# Patient Record
Sex: FEMALE | Race: WHITE | Employment: FULL TIME | URBAN - METROPOLITAN AREA
[De-identification: names, ages, dates, MRNs, and addresses within clinical notes are randomized per-mention and may not be internally consistent; named-entity substitution may affect disease eponyms.]

---

## 2017-06-21 LAB — RUBELLA, EXTERNAL: NORMAL

## 2018-09-06 LAB
HBSAG, EXTERNAL: NEGATIVE
HIV, EXTERNAL: NON REACTIVE
TYPE, ABO & RH, EXTERNAL: NORMAL

## 2018-11-19 ENCOUNTER — HOSPITAL ENCOUNTER (EMERGENCY)
Age: 28
Discharge: HOME | End: 2018-11-19
Attending: EMERGENCY MEDICINE
Payer: COMMERCIAL

## 2018-11-19 VITALS
HEART RATE: 85 BPM | SYSTOLIC BLOOD PRESSURE: 113 MMHG | RESPIRATION RATE: 16 BRPM | BODY MASS INDEX: 31.12 KG/M2 | WEIGHT: 193.6 LBS | OXYGEN SATURATION: 97 % | DIASTOLIC BLOOD PRESSURE: 77 MMHG | TEMPERATURE: 98 F | HEIGHT: 66 IN

## 2018-11-19 DIAGNOSIS — Z3A.19 19 WEEKS GESTATION OF PREGNANCY: ICD-10-CM
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
## 2018-11-19 DIAGNOSIS — S20.212A CONTUSION OF LEFT CHEST WALL, INITIAL ENCOUNTER: ICD-10-CM

## 2018-11-19 PROCEDURE — 74011250637 HC RX REV CODE- 250/637: Performed by: PHYSICIAN ASSISTANT

## 2018-11-19 PROCEDURE — 99284 EMERGENCY DEPT VISIT MOD MDM: CPT

## 2018-11-19 RX ORDER — ACETAMINOPHEN 325 MG/1
975 TABLET ORAL
Status: COMPLETED | OUTPATIENT
Start: 2018-11-19 | End: 2018-11-19

## 2018-11-19 RX ADMIN — ACETAMINOPHEN 975 MG: 325 TABLET, FILM COATED ORAL at 19:04
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
## 2018-11-20 NOTE — ED NOTES
Discharge instructions reviewed by provider; pt verbalized understanding of discharge and medication use. Vitals updated and pt ambulated from department with steady gait. No apparent distress noted.

## 2019-01-23 LAB
ANTIBODY SCREEN, EXTERNAL: NEGATIVE
T. PALLIDUM, EXTERNAL: NEGATIVE

## 2019-03-18 LAB — GRBS, EXTERNAL: NEGATIVE

## 2019-04-21 ENCOUNTER — ANESTHESIA (OUTPATIENT)
Dept: LABOR AND DELIVERY | Age: 29
End: 2019-04-21
Payer: COMMERCIAL

## 2019-04-21 ENCOUNTER — HOSPITAL ENCOUNTER (INPATIENT)
Age: 29
LOS: 3 days | Discharge: HOME | End: 2019-04-24
Attending: OBSTETRICS & GYNECOLOGY | Admitting: OBSTETRICS & GYNECOLOGY
Payer: COMMERCIAL

## 2019-04-21 ENCOUNTER — ANESTHESIA EVENT (OUTPATIENT)
Dept: LABOR AND DELIVERY | Age: 29
End: 2019-04-21
Payer: COMMERCIAL

## 2019-04-21 DIAGNOSIS — Z34.90 PREGNANCY, UNSPECIFIED GESTATIONAL AGE: Primary | ICD-10-CM

## 2019-04-21 LAB
ERYTHROCYTE [DISTWIDTH] IN BLOOD BY AUTOMATED COUNT: 13.6 % (ref 11.5–14.5)
HCT VFR BLD AUTO: 42.5 % (ref 35–47)
HGB BLD-MCNC: 13.8 G/DL (ref 11.5–16)
MCH RBC QN AUTO: 29.4 PG (ref 26–34)
MCHC RBC AUTO-ENTMCNC: 32.5 G/DL (ref 30–36.5)
MCV RBC AUTO: 90.4 FL (ref 80–99)
NRBC # BLD: 0 K/UL (ref 0–0.01)
NRBC BLD-RTO: 0 PER 100 WBC
PLATELET # BLD AUTO: 229 K/UL (ref 150–400)
PMV BLD AUTO: 11.6 FL (ref 8.9–12.9)
RBC # BLD AUTO: 4.7 M/UL (ref 3.8–5.2)
WBC # BLD AUTO: 11 K/UL (ref 3.6–11)

## 2019-04-21 PROCEDURE — A4300 CATH IMPL VASC ACCESS PORTAL: HCPCS

## 2019-04-21 PROCEDURE — 74011000250 HC RX REV CODE- 250

## 2019-04-21 PROCEDURE — 36415 COLL VENOUS BLD VENIPUNCTURE: CPT

## 2019-04-21 PROCEDURE — 3E0R3BZ INTRODUCTION OF ANESTHETIC AGENT INTO SPINAL CANAL, PERCUTANEOUS APPROACH: ICD-10-PCS | Performed by: ANESTHESIOLOGY

## 2019-04-21 PROCEDURE — 85027 COMPLETE CBC AUTOMATED: CPT

## 2019-04-21 PROCEDURE — 74011250636 HC RX REV CODE- 250/636

## 2019-04-21 PROCEDURE — 65270000029 HC RM PRIVATE

## 2019-04-21 PROCEDURE — 94760 N-INVAS EAR/PLS OXIMETRY 1: CPT

## 2019-04-21 PROCEDURE — 74011250636 HC RX REV CODE- 250/636: Performed by: OBSTETRICS & GYNECOLOGY

## 2019-04-21 PROCEDURE — 77030011943

## 2019-04-21 RX ORDER — FENTANYL CITRATE 50 UG/ML
100 INJECTION, SOLUTION INTRAMUSCULAR; INTRAVENOUS ONCE
Status: ACTIVE | OUTPATIENT
Start: 2019-04-21 | End: 2019-04-22

## 2019-04-21 RX ORDER — NALBUPHINE HYDROCHLORIDE 10 MG/ML
10 INJECTION, SOLUTION INTRAMUSCULAR; INTRAVENOUS; SUBCUTANEOUS
Status: DISCONTINUED | OUTPATIENT
Start: 2019-04-21 | End: 2019-04-22 | Stop reason: HOSPADM

## 2019-04-21 RX ORDER — NALOXONE HYDROCHLORIDE 0.4 MG/ML
0.4 INJECTION, SOLUTION INTRAMUSCULAR; INTRAVENOUS; SUBCUTANEOUS AS NEEDED
Status: DISCONTINUED | OUTPATIENT
Start: 2019-04-21 | End: 2019-04-22 | Stop reason: HOSPADM

## 2019-04-21 RX ORDER — SODIUM CHLORIDE 0.9 % (FLUSH) 0.9 %
5-40 SYRINGE (ML) INJECTION AS NEEDED
Status: DISCONTINUED | OUTPATIENT
Start: 2019-04-21 | End: 2019-04-22

## 2019-04-21 RX ORDER — LIDOCAINE HYDROCHLORIDE AND EPINEPHRINE 15; 5 MG/ML; UG/ML
INJECTION, SOLUTION EPIDURAL
Status: COMPLETED | OUTPATIENT
Start: 2019-04-21 | End: 2019-04-21

## 2019-04-21 RX ORDER — FENTANYL/BUPIVACAINE/NS/PF 2-1250MCG
PREFILLED PUMP RESERVOIR EPIDURAL
Status: DISCONTINUED
Start: 2019-04-21 | End: 2019-04-21 | Stop reason: WASHOUT

## 2019-04-21 RX ORDER — FENTANYL CITRATE 50 UG/ML
INJECTION, SOLUTION INTRAMUSCULAR; INTRAVENOUS AS NEEDED
Status: DISCONTINUED | OUTPATIENT
Start: 2019-04-21 | End: 2019-04-22 | Stop reason: HOSPADM

## 2019-04-21 RX ORDER — FENTANYL CITRATE 50 UG/ML
INJECTION, SOLUTION INTRAMUSCULAR; INTRAVENOUS
Status: DISPENSED
Start: 2019-04-21 | End: 2019-04-22

## 2019-04-21 RX ORDER — ONDANSETRON 2 MG/ML
4 INJECTION INTRAMUSCULAR; INTRAVENOUS
Status: DISCONTINUED | OUTPATIENT
Start: 2019-04-21 | End: 2019-04-22

## 2019-04-21 RX ORDER — SODIUM CHLORIDE 0.9 % (FLUSH) 0.9 %
5-40 SYRINGE (ML) INJECTION EVERY 8 HOURS
Status: DISCONTINUED | OUTPATIENT
Start: 2019-04-21 | End: 2019-04-22

## 2019-04-21 RX ORDER — BUPIVACAINE HYDROCHLORIDE 2.5 MG/ML
INJECTION, SOLUTION EPIDURAL; INFILTRATION; INTRACAUDAL
Status: COMPLETED | OUTPATIENT
Start: 2019-04-21 | End: 2019-04-21

## 2019-04-21 RX ORDER — CETIRIZINE HCL 10 MG
10 TABLET ORAL DAILY
COMMUNITY

## 2019-04-21 RX ORDER — EPHEDRINE SULFATE/0.9% NACL/PF 50 MG/5 ML
SYRINGE (ML) INTRAVENOUS
Status: DISCONTINUED
Start: 2019-04-21 | End: 2019-04-22 | Stop reason: WASHOUT

## 2019-04-21 RX ORDER — BUPIVACAINE HYDROCHLORIDE 2.5 MG/ML
INJECTION, SOLUTION EPIDURAL; INFILTRATION; INTRACAUDAL
Status: COMPLETED
Start: 2019-04-21 | End: 2019-04-21

## 2019-04-21 RX ORDER — TERBUTALINE SULFATE 1 MG/ML
0.25 INJECTION SUBCUTANEOUS AS NEEDED
Status: DISCONTINUED | OUTPATIENT
Start: 2019-04-21 | End: 2019-04-22

## 2019-04-21 RX ORDER — SODIUM CHLORIDE, SODIUM LACTATE, POTASSIUM CHLORIDE, CALCIUM CHLORIDE 600; 310; 30; 20 MG/100ML; MG/100ML; MG/100ML; MG/100ML
125 INJECTION, SOLUTION INTRAVENOUS CONTINUOUS
Status: DISCONTINUED | OUTPATIENT
Start: 2019-04-21 | End: 2019-04-22

## 2019-04-21 RX ORDER — FENTANYL/BUPIVACAINE/NS/PF 2-1250MCG
1-16 PREFILLED PUMP RESERVOIR EPIDURAL CONTINUOUS
Status: DISCONTINUED | OUTPATIENT
Start: 2019-04-21 | End: 2019-04-22

## 2019-04-21 RX ORDER — FENTANYL CITRATE 50 UG/ML
100 INJECTION, SOLUTION INTRAMUSCULAR; INTRAVENOUS
Status: DISCONTINUED | OUTPATIENT
Start: 2019-04-21 | End: 2019-04-22 | Stop reason: HOSPADM

## 2019-04-21 RX ADMIN — LIDOCAINE HYDROCHLORIDE AND EPINEPHRINE 3 ML: 15; 5 INJECTION, SOLUTION EPIDURAL at 21:45

## 2019-04-21 RX ADMIN — Medication 10 ML/HR: at 21:56

## 2019-04-21 RX ADMIN — SODIUM CHLORIDE, SODIUM LACTATE, POTASSIUM CHLORIDE, AND CALCIUM CHLORIDE 125 ML/HR: 600; 310; 30; 20 INJECTION, SOLUTION INTRAVENOUS at 22:04

## 2019-04-21 RX ADMIN — SODIUM CHLORIDE, SODIUM LACTATE, POTASSIUM CHLORIDE, AND CALCIUM CHLORIDE 1000 ML: 600; 310; 30; 20 INJECTION, SOLUTION INTRAVENOUS at 20:57

## 2019-04-21 RX ADMIN — FENTANYL CITRATE 100 MCG: 50 INJECTION, SOLUTION INTRAMUSCULAR; INTRAVENOUS at 21:47

## 2019-04-21 RX ADMIN — BUPIVACAINE HYDROCHLORIDE 5 ML: 2.5 INJECTION, SOLUTION EPIDURAL; INFILTRATION; INTRACAUDAL at 21:45

## 2019-04-22 PROCEDURE — 74011250636 HC RX REV CODE- 250/636: Performed by: OBSTETRICS & GYNECOLOGY

## 2019-04-22 PROCEDURE — 0KQM0ZZ REPAIR PERINEUM MUSCLE, OPEN APPROACH: ICD-10-PCS | Performed by: OBSTETRICS & GYNECOLOGY

## 2019-04-22 PROCEDURE — 77030005537 HC CATH URETH BARD -A

## 2019-04-22 PROCEDURE — 65410000002 HC RM PRIVATE OB

## 2019-04-22 PROCEDURE — 74011000250 HC RX REV CODE- 250: Performed by: ANESTHESIOLOGY

## 2019-04-22 PROCEDURE — 75410000000 HC DELIVERY VAGINAL/SINGLE

## 2019-04-22 PROCEDURE — 77030018846 HC SOL IRR STRL H20 ICUM -A

## 2019-04-22 PROCEDURE — 76060000078 HC EPIDURAL ANESTHESIA

## 2019-04-22 PROCEDURE — 75410000002 HC LABOR FEE PER 1 HR

## 2019-04-22 PROCEDURE — 74011250636 HC RX REV CODE- 250/636

## 2019-04-22 PROCEDURE — 77030011943

## 2019-04-22 PROCEDURE — 74011250637 HC RX REV CODE- 250/637: Performed by: OBSTETRICS & GYNECOLOGY

## 2019-04-22 PROCEDURE — 75410000003 HC RECOV DEL/VAG/CSECN EA 0.5 HR

## 2019-04-22 PROCEDURE — 99284 EMERGENCY DEPT VISIT MOD MDM: CPT

## 2019-04-22 RX ORDER — CHLOROPROCAINE HYDROCHLORIDE 30 MG/ML
INJECTION, SOLUTION EPIDURAL; INFILTRATION; INTRACAUDAL; PERINEURAL
Status: DISCONTINUED
Start: 2019-04-22 | End: 2019-04-22

## 2019-04-22 RX ORDER — OXYTOCIN/RINGER'S LACTATE 20/1000 ML
999 PLASTIC BAG, INJECTION (ML) INTRAVENOUS AS NEEDED
Status: DISCONTINUED | OUTPATIENT
Start: 2019-04-22 | End: 2019-04-24 | Stop reason: HOSPADM

## 2019-04-22 RX ORDER — DOCUSATE SODIUM 100 MG/1
100 CAPSULE, LIQUID FILLED ORAL
Status: DISCONTINUED | OUTPATIENT
Start: 2019-04-22 | End: 2019-04-24 | Stop reason: HOSPADM

## 2019-04-22 RX ORDER — OXYTOCIN/0.9 % SODIUM CHLORIDE 30/500 ML
0-25 PLASTIC BAG, INJECTION (ML) INTRAVENOUS
Status: DISCONTINUED | OUTPATIENT
Start: 2019-04-22 | End: 2019-04-24 | Stop reason: HOSPADM

## 2019-04-22 RX ORDER — HYDROCORTISONE ACETATE PRAMOXINE HCL 2.5; 1 G/100G; G/100G
CREAM TOPICAL AS NEEDED
Status: DISCONTINUED | OUTPATIENT
Start: 2019-04-22 | End: 2019-04-24 | Stop reason: HOSPADM

## 2019-04-22 RX ORDER — OXYCODONE AND ACETAMINOPHEN 5; 325 MG/1; MG/1
2 TABLET ORAL
Status: DISCONTINUED | OUTPATIENT
Start: 2019-04-22 | End: 2019-04-24 | Stop reason: HOSPADM

## 2019-04-22 RX ORDER — AMMONIA 15 % (W/V)
1 AMPUL (EA) INHALATION AS NEEDED
Status: DISCONTINUED | OUTPATIENT
Start: 2019-04-22 | End: 2019-04-24 | Stop reason: HOSPADM

## 2019-04-22 RX ORDER — SODIUM CHLORIDE 0.9 % (FLUSH) 0.9 %
5-40 SYRINGE (ML) INJECTION EVERY 8 HOURS
Status: DISCONTINUED | OUTPATIENT
Start: 2019-04-22 | End: 2019-04-24 | Stop reason: HOSPADM

## 2019-04-22 RX ORDER — HYDROCORTISONE 1 %
CREAM (GRAM) TOPICAL AS NEEDED
Status: DISCONTINUED | OUTPATIENT
Start: 2019-04-22 | End: 2019-04-24 | Stop reason: HOSPADM

## 2019-04-22 RX ORDER — OXYTOCIN/0.9 % SODIUM CHLORIDE 30/500 ML
PLASTIC BAG, INJECTION (ML) INTRAVENOUS
Status: COMPLETED
Start: 2019-04-22 | End: 2019-04-22

## 2019-04-22 RX ORDER — LIDOCAINE HYDROCHLORIDE 10 MG/ML
INJECTION INFILTRATION; PERINEURAL
Status: DISCONTINUED
Start: 2019-04-22 | End: 2019-04-22

## 2019-04-22 RX ORDER — IBUPROFEN 400 MG/1
800 TABLET ORAL EVERY 8 HOURS
Status: DISCONTINUED | OUTPATIENT
Start: 2019-04-22 | End: 2019-04-24 | Stop reason: HOSPADM

## 2019-04-22 RX ORDER — FENTANYL CITRATE 50 UG/ML
100 INJECTION, SOLUTION INTRAMUSCULAR; INTRAVENOUS ONCE
Status: COMPLETED | OUTPATIENT
Start: 2019-04-22 | End: 2019-04-22

## 2019-04-22 RX ORDER — SODIUM CHLORIDE 0.9 % (FLUSH) 0.9 %
5-40 SYRINGE (ML) INJECTION AS NEEDED
Status: DISCONTINUED | OUTPATIENT
Start: 2019-04-22 | End: 2019-04-24 | Stop reason: HOSPADM

## 2019-04-22 RX ORDER — ACETAMINOPHEN 325 MG/1
650 TABLET ORAL
Status: DISCONTINUED | OUTPATIENT
Start: 2019-04-22 | End: 2019-04-24 | Stop reason: HOSPADM

## 2019-04-22 RX ORDER — OXYTOCIN/RINGER'S LACTATE 20/1000 ML
125 PLASTIC BAG, INJECTION (ML) INTRAVENOUS AS NEEDED
Status: DISCONTINUED | OUTPATIENT
Start: 2019-04-22 | End: 2019-04-24 | Stop reason: HOSPADM

## 2019-04-22 RX ORDER — OXYCODONE AND ACETAMINOPHEN 5; 325 MG/1; MG/1
1 TABLET ORAL
Status: DISCONTINUED | OUTPATIENT
Start: 2019-04-22 | End: 2019-04-24 | Stop reason: HOSPADM

## 2019-04-22 RX ORDER — ONDANSETRON 4 MG/1
4 TABLET, ORALLY DISINTEGRATING ORAL
Status: DISCONTINUED | OUTPATIENT
Start: 2019-04-22 | End: 2019-04-24 | Stop reason: HOSPADM

## 2019-04-22 RX ORDER — DIPHENHYDRAMINE HCL 25 MG
25 CAPSULE ORAL
Status: DISCONTINUED | OUTPATIENT
Start: 2019-04-22 | End: 2019-04-24 | Stop reason: HOSPADM

## 2019-04-22 RX ORDER — SIMETHICONE 80 MG
80 TABLET,CHEWABLE ORAL
Status: DISCONTINUED | OUTPATIENT
Start: 2019-04-22 | End: 2019-04-24 | Stop reason: HOSPADM

## 2019-04-22 RX ADMIN — IBUPROFEN 800 MG: 400 TABLET ORAL at 20:13

## 2019-04-22 RX ADMIN — OXYTOCIN-SODIUM CHLORIDE 0.9% IV SOLN 30 UNIT/500ML 2 MILLI-UNITS/MIN: 30-0.9/5 SOLUTION at 07:01

## 2019-04-22 RX ADMIN — DOCUSATE SODIUM 100 MG: 100 CAPSULE, LIQUID FILLED ORAL at 20:13

## 2019-04-22 RX ADMIN — ACETAMINOPHEN 650 MG: 325 TABLET ORAL at 22:18

## 2019-04-22 RX ADMIN — SODIUM CHLORIDE, SODIUM LACTATE, POTASSIUM CHLORIDE, AND CALCIUM CHLORIDE 125 ML/HR: 600; 310; 30; 20 INJECTION, SOLUTION INTRAVENOUS at 06:05

## 2019-04-22 RX ADMIN — ACETAMINOPHEN 650 MG: 325 TABLET ORAL at 18:19

## 2019-04-22 RX ADMIN — FENTANYL CITRATE 100 MCG: 50 INJECTION, SOLUTION INTRAMUSCULAR; INTRAVENOUS at 10:31

## 2019-04-22 RX ADMIN — Medication 10 ML/HR: at 04:13

## 2019-04-22 RX ADMIN — IBUPROFEN 800 MG: 400 TABLET ORAL at 12:38

## 2019-04-22 NOTE — PROGRESS NOTES
1145: Bedside and Verbal shift change report given to SHI Fraser (oncoming nurse) by BOOGIE Pandey (offgoing nurse). Report included the following information SBAR, Procedure Summary, Intake/Output, MAR and Recent Results.

## 2019-04-22 NOTE — PROGRESS NOTES
4/21/2019  8:04 PM Pt arrived to L&D room 4 with c/o UCs since about 1300. States that they are now q 3-5 mins. Denies LOF. Reports pink, mucousy discharge. 2043 Spoke to Dr. Eunice Putnam on phone. Updated him on pt's arrival, c/o, SVE, FHR tracing, UC pattern, GBS negative, and that pt plans for an epidural but does not want one at this time. Inpatient orders received. 2100 Dr. Eunice Putnam at bedside, assessing pt. Pt ready for epidural at this time. 4/22/19@ 4366 Dr. Eunice Putnam at bedside. Aware that we have been pushing for approx. 2 hours after about an hour of laboring down and that pt pushing effectively at times, but at other times not sure where/how to push. Assessed strip and progress of pushing. Orders received for Pitocin and to continue pushing.

## 2019-04-22 NOTE — ANESTHESIA POSTPROCEDURE EVALUATION
Post-Anesthesia Evaluation and Assessment Patient: Haley Solorio MRN: [id number]  SSN: [id number] YOB: 1990  Age: 29 y.o. Sex: female I have evaluated the patient and they are stable and ready for discharge from the PACU. Cardiovascular Function/Vital Signs Visit Vitals /83 (BP 1 Location: Left arm, BP Patient Position: At rest) Pulse 96 Temp 37 °C (98.6 °F) Resp 16 Ht 5' 6\" (1.676 m) Wt 94.8 kg (209 lb) SpO2 96% Breastfeeding? Unknown BMI 33.73 kg/m² Patient is status post * No anesthesia type entered * anesthesia for * No procedures listed *. Nausea/Vomiting: None Postoperative hydration reviewed and adequate. Pain: 
Pain Scale 1: Numeric (0 - 10) (04/22/19 1408) Pain Intensity 1: 4 (04/22/19 1408) Managed Neurological Status: At baseline Mental Status, Level of Consciousness: Alert and  oriented to person, place, and time Pulmonary Status:  
O2 Device: Room air (04/22/19 1310) Adequate oxygenation and airway patent Complications related to anesthesia: None Post-anesthesia assessment completed. No concerns Signed By: Prosper Fontana MD   
 April 22, 2019 * No procedures listed *. 
 
epidural 
 
<BSHSIANPOST> No vitals data found for the desired time range.

## 2019-04-22 NOTE — ANESTHESIA PROCEDURE NOTES
Epidural Block    Performed by: Ame Abernathy DO  Authorized by: Ame Abernathy DO     Pre-Procedure  Indication: labor epidural    Preanesthetic Checklist: patient identified, risks and benefits discussed, anesthesia consent, site marked, patient being monitored, timeout performed and anesthesia consent      Epidural:   Patient position:  Seated  Prep region:  Lumbar  Prep: Patient draped and DuraPrep    Location:  L3-4    Needle and Epidural Catheter:   Needle Type:  Tuohy  Needle Gauge:  17 G  Injection Technique:  Loss of resistance using air  Attempts:  1  Catheter Size:  19 G  Catheter at Skin Depth (cm):  10  Depth in Epidural Space (cm):  5  Events: no blood with aspiration, no cerebrospinal fluid with aspiration, no paresthesia and negative aspiration test    Test Dose:  Negative    Assessment:   Catheter Secured:  Tegaderm and tape  Insertion:  Uncomplicated  Patient tolerance:  Patient tolerated the procedure well with no immediate complications

## 2019-04-22 NOTE — ROUTINE PROCESS
TRANSFER - IN REPORT: 
 
Verbal report received from Ariel Chadwick RN(name) on Betty Melendez  being received from L&D(unit) for routine progression of care Report consisted of patients Situation, Background, Assessment and  
Recommendations(SBAR). Information from the following report(s) SBAR was reviewed with the receiving nurse. Opportunity for questions and clarification was provided. Assessment completed upon patients arrival to unit and care assumed.

## 2019-04-22 NOTE — ANESTHESIA PREPROCEDURE EVALUATION
Relevant Problems No relevant active problems Anesthetic History No history of anesthetic complications Review of Systems / Medical History Patient summary reviewed, nursing notes reviewed and pertinent labs reviewed Pulmonary Within defined limits Neuro/Psych Within defined limits Cardiovascular Within defined limits GI/Hepatic/Renal 
Within defined limits Endo/Other Within defined limits Other Findings Physical Exam 
 
Airway Mallampati: II 
TM Distance: > 6 cm Neck ROM: normal range of motion Mouth opening: Normal 
 
 Cardiovascular Regular rate and rhythm,  S1 and S2 normal,  no murmur, click, rub, or gallop Dental 
No notable dental hx Pulmonary Breath sounds clear to auscultation Abdominal 
GI exam deferred Other Findings Anesthetic Plan ASA: 2 Anesthesia type: epidural 
 
 
 
 
Induction: Intravenous Anesthetic plan and risks discussed with: Patient

## 2019-04-22 NOTE — LACTATION NOTE
This note was copied from a baby's chart. Initial Lactation Consultation - Baby born vaginally this morning to a  mom at 36 weeks gestation. Mom has everted nipples but slightly thick. Baby is having difficulty getting the nipple deep enough in her mouth to stimulate the suck reflex. I got mom a nipple shield and then baby was able to get a good latch. At that point baby was tired and would not suck. I showed mom hand expression and we were able to express 3 drops of colostrum that we put into baby's mouth. Baby has a frenulum visible almost to the end of her tongue. She is able to extend her tongue to her gum line. She had a good suck on my finger. Dr Marylen Kempf notified of frenulum. Feeding Plan: Mother will keep baby skin to skin as often as possible, feed on demand, respond to feeding cues, obtain latch, listen for audible swallowing, be aware of signs of oxytocin release/ cramping, thirst, sleepiness while breastfeeding.

## 2019-04-22 NOTE — H&P
History & Physical 
 
Name: Bony Gleason MRN: [id number]  SSN: [id number] YOB: 1990  Age: 29 y.o. Sex: female Subjective:  
 
Reason for Admission:  Pregnancy and postdates History of Present Illness: Bony Gleason is a 29 y.o.  female with an estimated gestational age of 40w1d with Estimated Date of Delivery: 19. Patient complains of moderate contractions for 1 days. Pregnancy has been complicated by postdates. Patient denies chest pain, fever, headache , nausea and vomiting, right upper quadrant pain  , shortness of breath, swelling, vaginal bleeding , vaginal leaking of fluid  and visual disturbances. OB History [facility] 1 Para Term  AB Living SAB  
   
 TAB Ectopic Molar Multiple Live Births History reviewed. No pertinent past medical history. Past Surgical History:  
Procedure Laterality Date  HX WISDOM TEETH EXTRACTION Social History Occupational History  Not on file Tobacco Use  Smoking status: Never Smoker  Smokeless tobacco: Never Used Substance and Sexual Activity  Alcohol use: Not Currently  Drug use: Not Currently  Sexual activity: Yes  
  Partners: Male Birth control/protection: None History reviewed. No pertinent family history. No Known Allergies Prior to Admission medications Medication Sig Start Date End Date Taking? Authorizing Provider  
prenatal vit calc,iron,folic (PRENATAL VITAMIN PO) Take 1 Tab by mouth daily. Indications: pregnancy   Yes Provider, Historical  
cetirizine (ZYRTEC) 10 mg tablet Take 10 mg by mouth daily. Yes Provider, Historical  
  
 
Review of Systems Objective:  
 
Vitals:   
Vitals:  
 19 BP: 135/83  134/86 Pulse: 72  76 Resp: 16 Temp: 98.4 °F (36.9 °C) Weight:  94.8 kg (209 lb) Height:  5' 6\" (1.676 m) Temp (24hrs), Av.4 °F (36.9 °C), Min:98.4 °F (36.9 °C), Max:98.4 °F (36.9 °C) BP  Min: 134/86  Max: 135/83 Physical Exam 
 
Cervical Exam: 5 cm dilated 80% effaced   
-3 station Uterine Activity: every 3-5 min Membranes: Intact Fetal Heart Rate: Baseline: 120 per minute Variability: moderate Accelerations: yes Decelerations: none Uterine contractions: regular, every 3-5 minutes Labs:  
Recent Results (from the past 24 hour(s)) CBC W/O DIFF Collection Time: 19  9:05 PM  
Result Value Ref Range WBC 11.0 3.6 - 11.0 K/uL  
 RBC 4.70 3.80 - 5.20 M/uL  
 HGB 13.8 11.5 - 16.0 g/dL HCT 42.5 35.0 - 47.0 % MCV 90.4 80.0 - 99.0 FL  
 MCH 29.4 26.0 - 34.0 PG  
 MCHC 32.5 30.0 - 36.5 g/dL  
 RDW 13.6 11.5 - 14.5 % PLATELET 891 754 - 076 K/uL MPV 11.6 8.9 - 12.9 FL  
 NRBC 0.0 0  WBC ABSOLUTE NRBC 0.00 0.00 - 0.01 K/uL Patient Active Problem List  
Diagnosis Code  Pregnancy Z34.90 Assessment and Plan:  
 
IUP @ 41 weeks EGA Active Labor Admit for labor management Epidural Upon Request 
 
 
Signed By:  Samson Romero MD   
 2019   
  
 
 
 
 
 
immanuel

## 2019-04-22 NOTE — PROGRESS NOTES
0740: Bedside and Verbal shift change report given to BOOGIE Laughlin RN (oncoming nurse) by Geri Subramanian RN (offgoing nurse). Report included the following information SBAR, Intake/Output and MAR.  
 
0800: Positioned into semi flowlers with bar to push. 9844: Turn on right side to push. 0940: Dr. Marii Plasencia at bedside to assess fetal descent. 0915: Turn on left side to push. 0940: Attempted to push on back. Patient feeling pain in the back of her neck and unable to push on her side. 0940: Turned to right side to push. 5792: U/S to visualize fetal position. 4076: Straight cath to empty bladder by MD. 
1004:  liveborn female, APGAR's 5,5.  
1015: Patient feeling much of repair and is very uncomfortable/crying. Calling anesthesia for an epidural re-dose. 1020: Dr. Bobbi Richards at bedside to re-dose epidural.  
1031: 100 mg Fentanyl given to patient for continuing pain with repair. 1145: Bedside and Verbal shift change report given to SHI Castillo RN (oncoming nurse) by BOOGIE Laughlin RN (offgoing nurse). Report included the following information SBAR, Intake/Output and MAR.

## 2019-04-22 NOTE — L&D DELIVERY NOTE
Delivery Summary  Patient: Estefania Pagan             Circumcision:   NA-female  Additional Delivery Comments - Patient presented in active labor and pushed for 5.5 hours to deliver a VFI Con ) in OA position; loose nuchal x 1;shoulders delivered without event; placenta delivered intact within 10 minutes with a 3VC; 2nd degree as well as bilateral vaginal lacerations repaired with 2.0 monocryl; pt required re-dosing of epidural after local was not working as well as IV fentanyl for repair      Information for the patient's :  Doris Adorno [[id number]]       Labor Events:    Labor: No    Steroids: None   Cervical Ripening Date/Time:       Cervical Ripening Type:     Antibiotics During Labor:     Rupture Identifier:      Rupture Date/Time: 2019 1:26 AM   Rupture Type: SROM   Amniotic Fluid Volume:      Amniotic Fluid Description: Clear;Meconium    Amniotic Fluid Odor:      Induction:         Induction Date/Time:        Indications for Induction:      Augmentation:     Augmentation Date/Time:      Indications for Augmentation:     Labor complications:          Additional complications:        Delivery Events:  Indications For Episiotomy:     Episiotomy: None   Perineal Laceration(s): 2nd   Repaired: Yes   Periurethral Laceration Location:      Repaired:     Labial Laceration Location:     Repaired:     Sulcal Laceration Location:     Repaired:     Vaginal Laceration Location:     Repaired: Yes   Cervical Laceration Location:     Repaired:     Repair Suture:     Number of Repair Packets: 2   Estimated Blood Loss (ml):  ml     Delivery Date: 2019    Delivery Time: 10:04 AM  Delivery Type: Vaginal, Spontaneous  Sex:  Female    Gestational Age: 38w3d   Delivery Clinician:  Stef Santana  Living Status: Living   Delivery Location: L&D Room 4          APGARS  One minute Five minutes Ten minutes   Skin color: 0   1        Heart rate: 2   2        Grimace: 2   2 Muscle tone: 2   2        Breathin   2        Totals: 7   9            Presentation: Vertex    Position:        Resuscitation Method:  Suctioning-bulb; Tactile Stimulation     Meconium Stained: Thin      Cord Information: 3 Vessels  Complications: Nuchal Cord Without Compressions  Cord around: head  Delayed cord clamping?  No  Cord clamped date/time:   Disposition of Cord Blood: Lab    Blood Gases Sent?: No    Placenta:  Date/Time: 2019 10:13 AM  Removal: Spontaneous      Appearance: Normal      Measurements:  Birth Weight: 4.13 kg      Birth Length: 53.3 cm      Head Circumference: 35 cm      Chest Circumference:       Abdominal Girth: 36.5 cm    Other Providers:   CARMINA Uribe;;;;;;;, Obstetrician;Primary Nurse;Primary  Nurse           Cord pH:  none    Episiotomy: None   Laceration(s): 2nd     Estimated Blood Loss (ml):     Labor Events  Method:        Augmentation:     Cervical Ripening:                Hospital Problems  Date Reviewed: 2019          Codes Class Noted POA    Pregnancy ICD-10-CM: Z34.90  ICD-9-CM: V22.2  2019 Unknown              Operative Vaginal Delivery - none    Group B Strep:   Lab Results   Component Value Date/Time    GrBStrep, External negative 2019     Information for the patient's :  Virginia Juarez [[id number]]     Lab Results   Component Value Date/Time    ABO/Rh(D) O POSITIVE 2019 10:20 AM    MIRIAM IgG NEG 2019 10:20 AM    Bilirubin if MIRIAM pos: IF DIRECT REJI POSITIVE, BILIRUBIN TO FOLLOW 2019 10:20 AM     No results found for: APH, APCO2, APO2, AHCO3, ABEC, ABDC, O2ST, EPHV, PCO2V, PO2V, HCO3V, EBEV, EBDV, SITE, RSCOM

## 2019-04-22 NOTE — PROGRESS NOTES
Assumed care of this patient from Dr. Raysa Andino. Patient is a G1 @ 41 2/7 wks who presented in spontaneous labor. She has been fully dilated since 0330 and has been pushing since 0430. RN reports good effort with movement for the first hour. She has since been uncomfortable on her right side but has continued to push with good effort. Assessed station and pushing. Unable to fully tell position but station is +1 with some caput. Discussed with patient and  pushing for over 3 hours now. She would like to continue to push. I will assist on a  now and then reassess. Patient understands that at that point she will have needed to have made significant progress in order to continue pushing. No office EFW, Dr. Yony Ferrer was 8# on admission. Would also need to use US for position confirmation if an operative vaginal delivery is considered as I cannot feel sutures at this point. Continue pushing efforts. Patient will use her epidural button to try to get more comfortable. Fetal tracing is reassuring.

## 2019-04-23 PROCEDURE — 74011250637 HC RX REV CODE- 250/637: Performed by: OBSTETRICS & GYNECOLOGY

## 2019-04-23 PROCEDURE — 65410000002 HC RM PRIVATE OB

## 2019-04-23 RX ADMIN — IBUPROFEN 800 MG: 400 TABLET ORAL at 12:33

## 2019-04-23 RX ADMIN — ACETAMINOPHEN 650 MG: 325 TABLET ORAL at 02:12

## 2019-04-23 RX ADMIN — IBUPROFEN 800 MG: 400 TABLET ORAL at 20:40

## 2019-04-23 RX ADMIN — ACETAMINOPHEN 650 MG: 325 TABLET ORAL at 09:09

## 2019-04-23 RX ADMIN — IBUPROFEN 800 MG: 400 TABLET ORAL at 04:38

## 2019-04-23 RX ADMIN — ACETAMINOPHEN 650 MG: 325 TABLET ORAL at 14:59

## 2019-04-23 NOTE — ROUTINE PROCESS
Bedside and Verbal shift change report given to David Graves RN (oncoming nurse) by Oscar Wang RN (offgoing nurse). Report included the following information SBAR.

## 2019-04-23 NOTE — ROUTINE PROCESS
Bedside shift change report given to [address] (oncoming nurse) by [address] [facility] RN (offgoing nurse). Report included the following information SBAR, Procedure Summary, Intake/Output, MAR and Recent Results.

## 2019-04-23 NOTE — LACTATION NOTE
This note was copied from a baby's chart. Mom says she has been able to get the baby latched and nursing will the nipple shield but she couldn't get her latched directly to the breast. Moms nipples are everted but baby has a tight frenulum. This morning I helped baby get latched. Baby latched quickly with the nipple shield and was sucking rhythmically for a couple minutes. We took the shield off and then baby latched directly to the breast. Mom said it felt like the baby was biting her nipple and it was very painful for her. Baby nursed for another minute before coming off. We switched her to the other breast. We used the shield on the right breast but mom said it was too painful. I gave mom a latch assist and she was able to pull up a small drop of colostrum. I will set mom up with the breast pump. She will pump after nursing to stimulate her breasts. Charge nurse notified of tight frenulum and she will notify the hospitalist.  
 
 
Julia Colunga for Lingual Frenulum Function Function Appearance Lateralization:  
   2: Complete 1: Body of tongue but not tongue tip 
   0: None 1  
Appearance of tongue when lifted:  
   2: Round or square 1: Slight cleft in tip apparent 
   0: Heart or V-shaped 1  
 
Lift of tongue: 
   2: Tip to mid-mouth 
   1: Only edges to mid-mouth 
   0: Tip stays at lower alveolar ridge or 
       rises to mid-mouth only with jaw   
       closure                                                                                    0  
Elasticity of frenulum: 
    2: Very elastic 
    1: Moderately elastic 
    0: Little or no elasticity 1  
 
Extension of tongue: 
   2: Tip over lower lip 1: Tip over lower gum only 0: Neither of the above, or anterior              or mid-tongue humps 1 
  
Length of lingual frenulum when tongue lifted: 
   2: > 1 cm 
   1: = 1 cm 
   0: < 1 cm 1  
 
Spread of anterior tongue: 
   2: Complete 
   1: Moderate or partial 
  0: Little or none 1  
Attachment of lingual frenulum to tongue: 
   2: Posterior to tip 
   1: At tip 
   0: Notched Tip 1 Cuppin: Entire edge, firm cup 
   1: Side edges only, moderate cup 
   0: Poor or no cup 2  
 
Attachment of lingual frenulum to inferior alveolar ridge: 
   2: Attached to floor of mouth or well           below ridge 1: Attached just below ridge 
   0: Attached at ridge 1  
 
Peristalsis: 
    2: Complete, anterior to posterior 1: Partial, originating posterior to tip 
   0: None or reverse motion 2   
 
Snapback: 
   2: None 1: Periodic 
   0: Frequent or with each suck 2 Score Appearance: 5 
(<8 = ankyloglossia) Function:      9 
(<11 = ankyloglossia) Significant ankyloglossia is diagnosed when the appearance score total is 8 or less and/or function score total was 11 or less. Severe maternal nipple pain during breastfeeding, without alternate explanation, (as assessed by a Lactation Consultant), is also grounds to consider frenotomy, if a tight anterior frenulum is noted.   
 
Appearance Criteria: 
 
 Appearance of the tongue when lifted is determined by inspecting the anterior edge of the tongue as the infant cries or tries to lift or extend the tongue. Elasticity of the frenulum is determined by palpating the frenulum for elasticity while lifting the infants tongue. Length of the lingual frenulum is determined by noting its approximate 
length in centimeters as the tongue is lifted. Attachment of the frenulum to the tongue is determined by noting its origin on 
the inferior aspect of the tongue. It should be approximately 1 cm posterior to the tip. Attachment of the lingual frenulum to the inferior alveolar ridge is determined by noting the location of the anterior 
attachment of the frenulum. It should insert proximal to or into the genioglossus muscle on the floor of the mouth. Function Criteria: 
 
Lateralization is measured by eliciting the transverse tongue reflex by tracing the lower gum ridge and brushing the lateral edge of the tongue with the examiners finger. Lift of the tongue is noted when the finger is removed from the infants mouth. If the infant cries, then the tongue tip should lift to mid-mouth without jaw closure. Extension of the tongue is measured by eliciting the tongue extrusion reflex bybrushing the lower lip downward toward the chin. Spread of anterior tongue is determined by first eliciting a rooting reflex, just before cupping, by tickling the upper and lower lips and looking for even thinning of the anterior tongue. Cupping is a measure of the degree to which the tongue hugs the finger as the infant sucks on it. Peristalsis is a backward, wave-like motion of the tongue during sucking that should originate at the tip of the tongue and is felt with the back of the examiners finger. Snapback is heard as a clucking sound when the tethered tongue loses it grasp on the finger or breast when the infant tries to generate negative pressure. GENIE Noe, Tejal Camacho. (2002). Ankyloglossia: Assessment, Incidence, and 
Effect of Frenuloplasty on the Breastfeeding Dyad.  Pediatrics 2002;110;e63

## 2019-04-23 NOTE — PROGRESS NOTES
Bedside and Verbal shift change report given to [address] [place], [place] [address] [address] (oncoming nurse) by Roxi Seaman RN (offgoing nurse). Report included the following information SBAR, Kardex and MAR.

## 2019-04-23 NOTE — LACTATION NOTE
This note was copied from a baby's chart. I assisted mom with feeding this morning. Mom started off using the nipple shield. The baby was able to get a good latch on the shield but she got frustrated quickly and kept popping on and off. Mom was not able to tolerate the baby on the right breast. She said it felt like the baby was biting down on her nipple. We were able to get her latched directly to the left breast. She nursed for 5 minutes with a couple swallows heard. I recommended that mom pump and give the baby any breast milk she is able to collect. 0 - I helped mom get the baby latched this afternoon. It was still too painful for baby to latch directly to the breast but she latched well with the nipple shield on the right breast. Mom will continue to use the nipple shield as needed.

## 2019-04-23 NOTE — PROGRESS NOTES
Post-Partum Day Number 1 Progress Note Carmen Suarez Assessment: Doing well, post partum day 1 Plan: 1. Continue routine postpartum and perineal care as well as maternal education. 2. N/A Information for the patient's :  Mavis Cline [[id number]] Vaginal, Spontaneous Patient doing well without significant complaint. Voiding without difficulty, normal lochia. Vitals: 
Visit Vitals BP 98/63 (BP 1 Location: Left arm, BP Patient Position: At rest) Pulse 86 Temp 98 °F (36.7 °C) Resp 14 Ht 5' 6\" (1.676 m) Wt 94.8 kg (209 lb) SpO2 96% Breastfeeding? Unknown BMI 33.73 kg/m² Temp (24hrs), Av.2 °F (36.8 °C), Min:97.4 °F (36.3 °C), Max:98.8 °F (37.1 °C) Exam:   Patient without distress. Abdomen soft, fundus firm, nontender Perineum with normal lochia noted. Lower extremities are negative for swelling, cords or tenderness. Labs:  
 
Lab Results Component Value Date/Time WBC 11.0 2019 09:05 PM  
 HGB 13.8 2019 09:05 PM  
 HCT 42.5 2019 09:05 PM  
 PLATELET 637  09:05 PM  
 
 
No results found for this or any previous visit (from the past 24 hour(s)).

## 2019-04-24 VITALS
HEART RATE: 97 BPM | DIASTOLIC BLOOD PRESSURE: 80 MMHG | HEIGHT: 66 IN | TEMPERATURE: 98.3 F | RESPIRATION RATE: 18 BRPM | WEIGHT: 209 LBS | BODY MASS INDEX: 33.59 KG/M2 | OXYGEN SATURATION: 97 % | SYSTOLIC BLOOD PRESSURE: 124 MMHG

## 2019-04-24 PROCEDURE — 74011250637 HC RX REV CODE- 250/637: Performed by: OBSTETRICS & GYNECOLOGY

## 2019-04-24 RX ORDER — IBUPROFEN 600 MG/1
600 TABLET ORAL
Qty: 30 TAB | Refills: 1 | Status: SHIPPED | OUTPATIENT
Start: 2019-04-24

## 2019-04-24 RX ORDER — OXYCODONE AND ACETAMINOPHEN 5; 325 MG/1; MG/1
1 TABLET ORAL
Qty: 12 TAB | Refills: 0 | Status: SHIPPED | OUTPATIENT
Start: 2019-04-24 | End: 2019-04-27

## 2019-04-24 RX ADMIN — IBUPROFEN 800 MG: 400 TABLET ORAL at 06:16

## 2019-04-24 RX ADMIN — ACETAMINOPHEN 650 MG: 325 TABLET ORAL at 08:49

## 2019-04-24 RX ADMIN — DOCUSATE SODIUM 100 MG: 100 CAPSULE, LIQUID FILLED ORAL at 08:49

## 2019-04-24 RX ADMIN — ACETAMINOPHEN 650 MG: 325 TABLET ORAL at 13:29

## 2019-04-24 NOTE — LACTATION NOTE
This note was copied from a baby's chart. Infant continues to use shield to nurse. Latch without shield is extremely painful for mother, causing her to cry. Nipple is intact with mild chafing only. Mother is unable to tolerate feeding directly. ENT came yesterday and told family that frenulum is likely not the cause of latching issue. Frenulum is visible to tip of tongue, with pronounced heart sign, and does not extend past gumline, see Hazelbaker scale. Mother is able to latch baby independently and latch is deep. Parents stated that they are convinced that the frenulum is causing the pain issue and asks about seeking a second opinion from another ENTgroup. They do not want ENT that came yesterday to return. Parents given information about ENT practices that do frenotomy procedure. Family made an outpatient ENT consult with [facility] after discharge today. Mother states that she has no further questions for Lactation Consultant before discharge.

## 2019-04-24 NOTE — DISCHARGE INSTRUCTIONS
POSTPARTUM DISCHARGE INSTRUCTIONS       Name:  Trista Blankenship  YOB: 1990  Admission Diagnosis:  Pregnancy [Z34.90]  Pregnancy [Z34.90]     Discharge Diagnosis:    Problem List as of 4/24/2019 Date Reviewed: 4/21/2019          Codes Class Noted - Resolved    Pregnancy ICD-10-CM: Z34.90  ICD-9-CM: V22.2  4/21/2019 - Present            Attending Physician:  Kylah Xavier,*    Delivery Type:  Vaginal Childbirth: What To Expect At Home    Your Recovery: Your body will slowly heal in the next few weeks. It is easy to get too tired and overwhelmed during the first weeks after your baby is born. Changes in your hormones can shift your mood without warning. You may find it hard to meet the extra demands on your energy and time. Take it easy on yourself. Follow-up care is a key part of your treatment and safety. Be sure to make and go to all appointments, and call your doctor if you are having problems. It's also a good idea to know your test results and keep a list of the medicines you take. How can you care for yourself at home? Vaginal bleeding and cramps  · After delivery, you will have a bloody discharge from the vagina. This will turn pink within a week and then white or yellow after about 10 days. It may last for 2 to 4 weeks or longer, until the uterus has healed. Use pads instead of tampons until you stop bleeding. · Do not worry if you pass some blood clots, as long as they are smaller than a golf ball. If you have a tear or stitches in your vaginal area, change the pad at least every 4 hours to prevent soreness and infection. · You may have cramps for the first few days after childbirth. These are normal and occur as the uterus shrinks to normal size. Take an over-the-counter pain medicine, such as acetaminophen (Tylenol), ibuprofen (Advil, Motrin), or naproxen (Aleve), for cramps. Read and follow all instructions on the label.  Do not take aspirin, because it can cause more bleeding. Do not take acetaminophen (Tylenol) and other acetaminophen containing medications (i.e. Percocet) at the same time. Breast fullness  · Your breasts may overfill (engorge) in the first few days after delivery. To help milk flow and to relieve pain, warm your breasts in the shower or by using warm, moist towels before nursing. · If you are not nursing, do not put warmth on your breasts or touch your breasts. Wear a tight bra or sports bra and use ice until the fullness goes away. This usually takes 2 to 3 days. · Put ice or a cold pack on your breast after nursing to reduce swelling and pain. Put a thin cloth between the ice and your skin. Activity  · Eat a balanced diet. Do not try to lose weight by cutting calories. Keep taking your prenatal vitamins, or take a multivitamin. · Get as much rest as you can. Try to take naps when your baby sleeps during the day. · Get some exercise every day. But do not do any heavy exercise until your doctor says it is okay. · Wait until you are healed (about 4 to 6 weeks) before you have sexual intercourse. Your doctor will tell you when it is okay to have sex. · Talk to your doctor about birth control. You can get pregnant even before your period returns. Also, you can get pregnant while you are breast-feeding. Mental Health  · Many women get the \"baby blues\" during the first few days after childbirth. You may lose sleep, feel irritable, and cry easily. You may feel happy one minute and sad the next. Hormone changes are one cause of these emotional changes. Also, the demands of a new baby, along with visits from relatives or other family needs, add to a mother's stress. The \"baby blues\" often peak around the fourth day. Then they ease up in less than 2 weeks. · If your moodiness or anxiety lasts for more than 2 weeks, or if you feel like life is not worth living, you may have postpartum depression. This is different for each mother.  Some mothers with serious depression may worry intensely about their infant's well-being. Others may feel distant from their child. Some mothers might even feel that they might harm their baby. A mother may have signs of paranoia, wondering if someone is watching her. · With all the changes in your life, you may not know if you are depressed. Pregnancy sometimes causes changes in how you feel that are similar to the symptoms of depression. · Symptoms of depression include:  · Feeling sad or hopeless and losing interest in daily activities. These are the most common symptoms of depression. · Sleeping too much or not enough. · Feeling tired. You may feel as if you have no energy. · Eating too much or too little. · POSTPARTUM [employer] (PSI) offers a Warm line; Chat with the Expert phone sessions; Information and Articles about Pregnancy and Postpartum Mood Disorders; Comprehensive List of Free Support Groups; Knowledgeable local coordinators who will offer support, information, and resources; Guide to Resources on [employer]; Calendar of events in the  mood disorders community; Latest News and Research; and [facility] [address] for [employer]. Remember - You are not alone; You are not to blame; With help, you will be well. 4-686-658-PPD(9822). WWW. POSTPARTUM. NET   · Writing or talking about death, such as writing suicide notes or talking about guns, knives, or pills. Keep the numbers for these national suicide hotlines: 2-931-426-TALK (3-429.814.6908) and 4-750-JAQQVBZ (4-339.151.6279). If you or someone you know talks about suicide or feeling hopeless, get help right away. Constipation and Hemorrhoids  · Drink plenty of fluids, enough so that your urine is light yellow or clear like water. If you have kidney, heart, or liver disease and have to limit fluids, talk with your doctor before you increase the amount of fluids you drink. · Eat plenty of fiber each day.  Have a bran muffin or bran cereal for breakfast, and try eating a piece of fruit for a mid-afternoon snack. · For painful, itchy hemorrhoids, put ice or a cold pack on the area several times a day for 10 minutes at a time. Follow this by putting a warm compress on the area for another 10 to 20 minutes or by sitting in a shallow, warm bath. When should you call for help? Call 911 anytime you think you may need emergency care. For example, call if:  · You are thinking of hurting yourself, your baby, or anyone else. · You passed out (lost consciousness). · You have symptoms of a blood clot in your lung (called a pulmonary embolism). These may include:    · Sudden chest pain. · Trouble breathing. · Coughing up blood. Call your doctor now or seek immediate medical care if:  · You have severe vaginal bleeding. · You are soaking through a pad each hour for 2 or more hours. · Your vaginal bleeding seems to be getting heavier or is still bright red 4 days after delivery. · You are dizzy or lightheaded, or you feel like you may faint. · You are vomiting or cannot keep fluids down. · You have a fever. · You have new or more belly pain. · You pass tissue (not just blood). · Your vaginal discharge smells bad. · Your belly feels tender or full and hard. · Your breasts are continuously painful or red. · You feel sad, anxious, or hopeless for more than a few days. · You have sudden, severe pain in your belly. · You have symptoms of a blood clot in your leg (called a deep vein thrombosis),          such as:  · Pain in your calf, back of the knee, thigh, or groin. · Redness and swelling in your leg or groin. · You have symptoms of preeclampsia, such as:  · Sudden swelling of your face, hands, or feet. · New vision problems (such as dimness or blurring). · A severe headache. · Your blood pressure is higher than it should be or rises suddenly. · You have new nausea or vomiting.     Watch closely for changes in your health, and be sure to contact your doctor if you have any problems. Additional Information:  Postpartum Support    PARENTS:  Are you feeling sad or depressed? Is it difficult for you to enjoy yourself? Do you feel more irritable or tense? Do you feel anxious or panicky? Are you having difficulty bonding with your baby? Do you feel as if you are \"out of control\" or \"going crazy\"? Are you worried that you might hurt your baby or yourself? FAMILIES: Do you worry that something is wrong but don't know how to help? Do you think that your partner or spouse is having problems coping? Are you worried that it may never get better? While many women experience some mild mood change or \"the blues\" during or after the birth of a child, 1 in 9 women experience more significant symptoms of depression or anxiety. 1 in 10 Dads become depressed during the first year. Things you can do  Being a good parent includes taking care of yourself. If you take care of yourself, you will be able to take better care of your baby and your family. · Talk to a counselor or healthcare provider who has training in  mood and anxiety problems. · Learn as much as you can about pregnancy and postpartum depression and anxiety. · Get support from family and friends. Ask for help when you need it. · Join a support group in your area or online. · Keep active by walking, stretching or whatever form of exercise helps you to feel better. · Get enough rest and time for yourself. · Eat a healthy diet. · Don't give up! It may take more than one try to get the right help you need. These are general instructions for a healthy lifestyle:    No smoking/ No tobacco products/ Avoid exposure to second hand smoke    Surgeon General's Warning:  Quitting smoking now greatly reduces serious risk to your health.     Obesity, smoking, and sedentary lifestyle greatly increases your risk for illness    A healthy diet, regular physical exercise & weight monitoring are important for maintaining a healthy lifestyle    Recognize signs and symptoms of STROKE:    F-face looks uneven    A-arms unable to move or move unevenly    S-speech slurred or non-existent    T-time-call 911 as soon as signs and symptoms begin - DO NOT go       back to bed or wait to see if you get better - TIME IS BRAIN. I have had the opportunity to make my options or choices for discharge. I have received and understand these instructions.

## 2019-04-24 NOTE — ROUTINE PROCESS
Bedside SBAR received from Sudhir Walton RN. I have reviewed discharge instructions with the patient. The patient verbalized understanding.

## 2019-04-24 NOTE — DISCHARGE SUMMARY
Obstetrical Discharge Summary     Name: Reza Saha MRN: [id number]  SSN: [id number]    YOB: 1990  Age: 29 y.o. Sex: female      Admit Date: 2019    Discharge Date: 2019     Admitting Physician: Sky Rich MD     Attending Physician:  Michelle Marie, Kylah Whitaker,*     Admission Diagnoses: Pregnancy [Z34.90]  Pregnancy [Z34.90]    Discharge Diagnoses:   Information for the patient's :  Annette Benoit [[id number]]   Delivery of a 4.13 kg female infant via Vaginal, Spontaneous on 2019 at 10:04 AM  by . Apgars were 7 and 9. Additional Diagnoses:   Hospital Problems  Date Reviewed: 2019          Codes Class Noted POA    Pregnancy ICD-10-CM: Z34.90  ICD-9-CM: V22.2  2019 Unknown             Lab Results   Component Value Date/Time    Rubella, External immune 2017    GrBStrep, External negative 2019       Hospital Course: Normal hospital course following the delivery. Patient Instructions:   Current Discharge Medication List      START taking these medications    Details   ibuprofen (MOTRIN) 600 mg tablet Take 1 Tab by mouth every six (6) hours as needed for Pain. Qty: 30 Tab, Refills: 1      oxyCODONE-acetaminophen (PERCOCET) 5-325 mg per tablet Take 1 Tab by mouth every six (6) hours as needed for Pain for up to 3 days. Max Daily Amount: 4 Tabs. Qty: 12 Tab, Refills: 0    Associated Diagnoses: Pregnancy, unspecified gestational age         [de-identified] these medications which have NOT CHANGED    Details   prenatal vit calc,iron,folic (PRENATAL VITAMIN PO) Take 1 Tab by mouth daily. Indications: pregnancy      cetirizine (ZYRTEC) 10 mg tablet Take 10 mg by mouth daily. Disposition at Discharge: Home or self care    Condition at Discharge: Stable    Reference my discharge instructions.     Follow-up Appointments   Procedures    FOLLOW UP VISIT Appointment in: 6 Weeks     Standing Status:   Standing     Number of Occurrences:   1 Order Specific Question:   Appointment in     Answer:   6 Weeks        Signed By:  Yojana Pimentel MD     April 24, 2019

## 2019-04-24 NOTE — ROUTINE PROCESS
Bedside shift change report given to JIEMNA Angela RN (oncoming nurse) by DAVID Ty RN (offgoing nurse). Report included the following information SBAR, Kardex and Intake/Output.

## 2019-04-24 NOTE — PROGRESS NOTES
Post-Partum Day Number 2 Progress Note Kelsygianna Blankenship Assessment: Doing well, post partum day 2 Plan: 1. Discharge home today 2. Follow up in office in 6 weeks with Gary Vallejo, Elsy Chris,* 
3. Post partum activity advised, diet as tolerated 4. Discharge Medications: ibuprofen, percocet and medications prior to admission Information for the patient's :  Lyn Selby [[id number]] Vaginal, Spontaneous Patient doing well without significant complaint. Voiding without difficulty, normal lochia. Vitals: 
Visit Vitals /80 Pulse 97 Temp 98.3 °F (36.8 °C) Resp 18 Ht 5' 6\" (1.676 m) Wt 94.8 kg (209 lb) SpO2 97% Breastfeeding? Unknown BMI 33.73 kg/m² Temp (24hrs), Av.1 °F (36.7 °C), Min:97.7 °F (36.5 °C), Max:98.3 °F (36.8 °C) Exam:    
    Patient without distress. Abdomen soft, fundus firm, nontender Lower extremities are negative for swelling, cords or tenderness. Labs:  
 
Lab Results Component Value Date/Time WBC 11.0 2019 09:05 PM  
 HGB 13.8 2019 09:05 PM  
 HCT 42.5 2019 09:05 PM  
 PLATELET 200 10/37/7056 09:05 PM  
 
 
No results found for this or any previous visit (from the past 24 hour(s)).